# Patient Record
(demographics unavailable — no encounter records)

---

## 2025-03-16 NOTE — HISTORY OF PRESENT ILLNESS
[FreeTextEntry1] : follow up [de-identified] : This is a 62F with PMHx of HTN, HLD, GERD, anxiety, allergies, morbid obesity (on ozempic 0.25mg/weekly) who presents for follow up. Patient of Dr Galarza. Last seen Oct 2024.  1) Requesting GLP-1: she previously has been trying her 's leftover ozempic but is inquiring about getting prescription for herself, she followed with endocrinology/weight loss team at [place] 2 years ago but never had diagnosis of T2DM.

## 2025-03-16 NOTE — HISTORY OF PRESENT ILLNESS
[FreeTextEntry1] : follow up [de-identified] : This is a 62F with PMHx of HTN, HLD, GERD, anxiety, allergies, morbid obesity (on ozempic 0.25mg/weekly) who presents for follow up. Patient of Dr Galarza. Last seen Oct 2024.  1) Requesting GLP-1: she previously has been trying her 's leftover ozempic but is inquiring about getting prescription for herself, she followed with endocrinology/weight loss team at [place] 2 years ago but never had diagnosis of T2DM.